# Patient Record
Sex: FEMALE | Race: WHITE
[De-identification: names, ages, dates, MRNs, and addresses within clinical notes are randomized per-mention and may not be internally consistent; named-entity substitution may affect disease eponyms.]

---

## 2018-08-10 ENCOUNTER — HOSPITAL ENCOUNTER (EMERGENCY)
Dept: HOSPITAL 26 - MED | Age: 53
Discharge: HOME | End: 2018-08-10
Payer: SELF-PAY

## 2018-08-10 VITALS — DIASTOLIC BLOOD PRESSURE: 102 MMHG | SYSTOLIC BLOOD PRESSURE: 196 MMHG

## 2018-08-10 VITALS — BODY MASS INDEX: 31.83 KG/M2 | WEIGHT: 173 LBS | HEIGHT: 62 IN

## 2018-08-10 VITALS — SYSTOLIC BLOOD PRESSURE: 147 MMHG | DIASTOLIC BLOOD PRESSURE: 75 MMHG

## 2018-08-10 DIAGNOSIS — R42: ICD-10-CM

## 2018-08-10 DIAGNOSIS — I10: Primary | ICD-10-CM

## 2018-08-10 DIAGNOSIS — F43.9: ICD-10-CM

## 2018-08-10 PROCEDURE — 99283 EMERGENCY DEPT VISIT LOW MDM: CPT

## 2018-08-10 NOTE — NUR
Patient discharged with v/s stable. Written and verbal after care instructions 
given and explained. 

Patient alert, oriented and verbalized understanding of instructions. 
Ambulatory with steady gait. All questions addressed prior to discharge. ID 
band removed. Patient advised to follow up with PMD. Rx of VISTARIL, LISINOPRIL 
given. Patient educated on indication of medication including possible reaction 
and side effects. Opportunity to ask questions provided and answered.

## 2018-08-10 NOTE — NUR
53 Y/O F PRESENTS TO THE ED W/C/O "I HAVE HIGH BLOOD PRESSURE." PT STATES SHE 
"HAD HIGH BLOOD PRESSURE YESTERDAY BECAUSE I FELT STRESSED AT WORK AND I 
CHECKED." PT ALSO STATES SHE HAS BEEN FEELING "DIZZY" AND HAS A HEADACHE TODAY 
IN THE FRONTAL LOBES. NO NYSTAGMUS NOTED. PT DENIES N/V/D; SKIN IS INTACT, 
PINK/WARM/DRY; AAOX4, PERRL, WITH EVEN AND STEADY GAIT; LUNGS CLEAR BL, 
BREATHING UNLABORED; HR EVEN AND REGULAR, BL PERIPHERAL PULSES PRESENT; BS 
ACTIVE X4, NO TENDERNESS TO PALPATION, NO HEPATOSPLENOMEGALLY PALPATED, 
RESONANT TO PERCUSSION; PT DENIES ANY FEVER, CP, SOB, OR COUGH AT THIS TIME; PT 
STATES 8/10 PAIN AT THIS TIME IN FRONTAL LOBES THAT FEELS LIKE THROBBING; VSS; 
PATIENT POSITIONED FOR COMFORT; HOB ELEVATED; BEDRAILS UP X2; BED DOWN.